# Patient Record
Sex: FEMALE | Race: WHITE | NOT HISPANIC OR LATINO
[De-identification: names, ages, dates, MRNs, and addresses within clinical notes are randomized per-mention and may not be internally consistent; named-entity substitution may affect disease eponyms.]

---

## 2022-09-13 PROBLEM — Z00.00 ENCOUNTER FOR PREVENTIVE HEALTH EXAMINATION: Status: ACTIVE | Noted: 2022-09-13

## 2022-09-14 ENCOUNTER — APPOINTMENT (OUTPATIENT)
Dept: ORTHOPEDIC SURGERY | Facility: CLINIC | Age: 67
End: 2022-09-14

## 2022-09-14 PROCEDURE — 99203 OFFICE O/P NEW LOW 30 MIN: CPT

## 2022-09-14 NOTE — ASSESSMENT
[FreeTextEntry1] : Discussed at length with patient regarding symptoms and diagnosis.  Treatment options discussed and patient's questions answered and patient expresses understanding.  CAM walker boot and patient to follow-up in 3 weeks.\par Patient instructed to call if any questions or concerns.\par

## 2022-09-14 NOTE — HISTORY OF PRESENT ILLNESS
[de-identified] : Patient is a new patient presenting with pain at the LEFT lateral ankle.  States slipped on a marble floor on 9/9/22 with pain and limited ability to weightbear.  States seen at [facility].  States pain and swelling improving.

## 2022-09-14 NOTE — PHYSICAL EXAM
[de-identified] : Left ankle\par \par Constitutional: \par The patient is healthy-appearing and in no apparent distress. \par \par Gait and Station: \par The patient ambulates with a limp and rolling knee scooter. \par \par Cardiovascular System: \par The capillary refill is less than 2 seconds. \par \par Skin: \par There are no skin abnormalities of ankle.\par \par Ankles and Feet: \par Inspection: \par There is no erythema.\par There is no induration.\par There is no warmth.\par There is no deformity.  \par There is swelling diffusely at the ankle. \par \par Bony Palpation: \par There is no tenderness of the calcaneal tuberosity.\par There is no tenderness of the metatarsals.\par There is no tenderness of the tarsometatarsal joints\par There is no tenderness of the navicular tuberosity. \par There is no tenderness of the dome of talus.\par There is no tenderness of the head of talus.\par There is no tenderness of the inferior tibiofibular joint.\par There is tenderness of the lateral malleolus.\par \par Soft Tissue Palpation: \par There is no tenderness of the tibialis posterior.\par There is no tenderness of the tibialis anterior. \par There is no tenderness of the plantar fascia.\par There is no tenderness of the Achilles tendon.\par There is no tenderness of the extensor hallucis longus.\par There is no tenderness of the sinus tarsi. \par There is no tenderness of the peroneus longus and brevis.\par There is no tenderness of the deltoid ligament.   \par There is no tenderness of the anterior talofibular ligament and the calcaneofibular ligament. \par \par Active Range of Motion: \par The range of motion at the ankle is full. \par \par Stability: \par The anterior drawer is negative. \par \par Strength: \par There is 5/5 ankle plantarflexion and dorsiflexion.\par \par Neurological System: \par There is normal sensation to light touch at the ankle and foot. \par \par Psychiatric: \par The patient demonstrates a normal mood and affect and is active and alert.\par  [de-identified] : X-ray LEFT ankle ( [facility] ): There is a non-displaced Richardson A lateral malleolus fracture with an intact mortise.

## 2022-10-05 ENCOUNTER — APPOINTMENT (OUTPATIENT)
Dept: ORTHOPEDIC SURGERY | Facility: CLINIC | Age: 67
End: 2022-10-05

## 2022-10-05 PROCEDURE — 73610 X-RAY EXAM OF ANKLE: CPT | Mod: LT

## 2022-10-05 PROCEDURE — 99213 OFFICE O/P EST LOW 20 MIN: CPT

## 2022-10-05 NOTE — HISTORY OF PRESENT ILLNESS
[de-identified] : Patient is presenting for followup evaluation she is overall she is feeling markedly improved.  Weightbearing as tolerated Cam Walker only

## 2022-10-05 NOTE — PHYSICAL EXAM
[de-identified] : Left ankle\par \par Constitutional: \par The patient is healthy-appearing and in no apparent distress. \par \par Gait and Station: \par The patient ambulates with a CAM walker. \par \par Cardiovascular System: \par The capillary refill is less than 2 seconds. \par \par Skin: \par There are no skin abnormalities of ankle.\par \par Ankles and Feet: \par Inspection: \par There is no erythema.\par There is no induration.\par There is no warmth.\par There is no deformity.  \par There is trace swelling laterally at the ankle. \par \par Bony Palpation: \par There is no tenderness of the calcaneal tuberosity.\par There is no tenderness of the metatarsals.\par There is no tenderness of the tarsometatarsal joints\par There is no tenderness of the navicular tuberosity. \par There is no tenderness of the dome of talus.\par There is no tenderness of the head of talus.\par There is no tenderness of the inferior tibiofibular joint.\par There is no tenderness of the lateral malleolus.\par \par Soft Tissue Palpation: \par There is no tenderness of the tibialis posterior.\par There is no tenderness of the tibialis anterior. \par There is no tenderness of the plantar fascia.\par There is no tenderness of the Achilles tendon.\par There is no tenderness of the extensor hallucis longus.\par There is no tenderness of the sinus tarsi. \par There is no tenderness of the peroneus longus and brevis.\par There is no tenderness of the deltoid ligament.   \par There is no tenderness of the anterior talofibular ligament and the calcaneofibular ligament. \par \par Active Range of Motion: \par The range of motion at the ankle is full. \par \par Stability: \par The anterior drawer is negative. \par \par Strength: \par There is 5/5 ankle plantarflexion and dorsiflexion.\par \par Neurological System: \par There is normal sensation to light touch at the ankle and foot. \par \par Psychiatric: \par The patient demonstrates a normal mood and affect and is active and alert.\par  [de-identified] : Given patient's reported history and physical examination, x-ray evaluation ( as listed below ) was ordered and performed to aid in diagnosis and treatment of the patient.\par X-ray left ankle.  There is a healed distal fibula fracture with adequate overall alignment\par

## 2022-10-05 NOTE — ASSESSMENT
[FreeTextEntry1] : Discussed at this time to discontinue Cam Walker and given home exercises as well as physical therapy,  She is to follow up on an as-needed basis in 5-6 weeks

## 2022-11-25 ENCOUNTER — EMERGENCY (EMERGENCY)
Facility: HOSPITAL | Age: 67
LOS: 1 days | Discharge: HOME | End: 2022-11-25
Admitting: EMERGENCY MEDICINE
Payer: MEDICARE

## 2022-11-25 VITALS
WEIGHT: 128.97 LBS | OXYGEN SATURATION: 99 % | HEART RATE: 67 BPM | SYSTOLIC BLOOD PRESSURE: 135 MMHG | HEIGHT: 60 IN | DIASTOLIC BLOOD PRESSURE: 76 MMHG | RESPIRATION RATE: 17 BRPM | TEMPERATURE: 98 F
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
## 2022-11-25 DIAGNOSIS — I10 ESSENTIAL (PRIMARY) HYPERTENSION: ICD-10-CM

## 2022-11-25 DIAGNOSIS — M25.532 PAIN IN LEFT WRIST: ICD-10-CM

## 2022-11-25 DIAGNOSIS — S52.612A DISPLACED FRACTURE OF LEFT ULNA STYLOID PROCESS, INITIAL ENCOUNTER FOR CLOSED FRACTURE: ICD-10-CM
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
## 2022-11-25 DIAGNOSIS — S52.552A OTHER EXTRAARTICULAR FRACTURE OF LOWER END OF LEFT RADIUS, INITIAL ENCOUNTER FOR CLOSED FRACTURE: ICD-10-CM

## 2022-11-25 PROCEDURE — 99284 EMERGENCY DEPT VISIT MOD MDM: CPT

## 2022-11-25 PROCEDURE — 73100 X-RAY EXAM OF WRIST: CPT | Mod: 26,RT

## 2022-11-25 PROCEDURE — 73110 X-RAY EXAM OF WRIST: CPT

## 2022-11-25 PROCEDURE — 73110 X-RAY EXAM OF WRIST: CPT | Mod: 26,LT

## 2022-11-25 PROCEDURE — 73100 X-RAY EXAM OF WRIST: CPT

## 2022-11-25 PROCEDURE — 25605 CLTX DST RDL FX/EPHYS SEP W/: CPT | Mod: LT

## 2022-11-25 PROCEDURE — 99285 EMERGENCY DEPT VISIT HI MDM: CPT | Mod: 25

## 2022-11-25 RX ORDER — HYDROMORPHONE HYDROCHLORIDE 2 MG/ML
0.5 INJECTION INTRAMUSCULAR; INTRAVENOUS; SUBCUTANEOUS ONCE
Refills: 0 | Status: DISCONTINUED | OUTPATIENT
Start: 2022-11-25 | End: 2022-11-25

## 2022-11-25 RX ADMIN — HYDROMORPHONE HYDROCHLORIDE 0.5 MILLIGRAM(S): 2 INJECTION INTRAMUSCULAR; INTRAVENOUS; SUBCUTANEOUS at 12:34

## 2022-11-25 NOTE — ED PROVIDER NOTE - OBJECTIVE STATEMENT
66 y/o F, PMHx of HTN, left hand dominant,  now presenting to ED w/ distal radius fx s/p mechanical fall since yesterday. Pt went to  yesterday and was told they had to reduce fx and come to ED. She didn't come yesterday. They put her in splint. She denies tingling and numbness. X ray shows impacted radius fx.

## 2022-11-25 NOTE — ED PROVIDER NOTE - NSFOLLOWUPINSTRUCTIONS_ED_ALL_ED_FT
Follow up with Dr. Graves at [facility]      Wrist Fracture Treated With Immobilization       A wrist fracture is a break or crack in one of the bones of your wrist. Your wrist is made of eight small bones at the palm of your hand (carpal bones) and two long bones of the forearm (radius and ulna).    A broken wrist is often treated by wearing a cast, splint, or sling (immobilization). This holds the broken pieces in place so they can heal.      What are the causes?    •A direct force to the wrist.      •Trauma, such as a car crash or falling on an outstretched hand.        What increases the risk?    •Doing contact sports or high-risk sports such as skiing, biking, and ice skating.      •Taking steroid medicines.      •Smoking.      •Being female.      •Being .      •Drinking more than three drinks that contain alcohol a day.      •Having a condition that weakens the bones (osteoporosis).      •Being older.      •Having had other broken bones in the past.        What are the signs or symptoms?    •Pain.       •Swelling.       •Bruising.       •Not being able to move the wrist normally.      •The wrist hanging in an odd position or looking oddly shaped.        How is this treated?    Treatment involves wearing a cast, splint, or sling. You may also need to take pain medicine. Once the injury has healed enough, you may begin doing range-of-motion exercises.      Follow these instructions at home:    If you have a cast:     • Do not stick anything inside the cast to scratch your skin.      •Check the skin around the cast every day. Tell your doctor if you see problems.      •You may put lotion on dry skin around the cast. Do not put lotion on the skin under the cast.      •Keep the cast clean and dry.      If you have a splint or sling:     •Wear the splint or sling as told by your doctor. Take it off only as told by your doctor.    •Loosen the splint or sling if your fingers:  •Tingle.      •Become numb.      •Turn cold and blue.        •Keep the splint or sling clean and dry.      Bathing     • Do not take baths, swim, or use a hot tub. Ask your doctor about taking showers or sponge baths.    •If your cast, splint, or sling is not waterproof:   •Do not let it get wet.      •Cover it with a watertight covering when you take a bath or shower.        •If you have a sling, take it off for bathing only if your doctor says this is okay.        Managing pain, stiffness, and swelling    •If told, put ice on the injured area. To do this:  •If you have a removable splint or sling, take it off as told by your doctor.      •Put ice in a plastic bag.      •Place a towel between your skin and the bag or between your cast and the bag.      •Leave the ice on for 20 minutes, 2–3 times a day.      •Take off the ice if your skin turns bright red. This is very important. If you cannot feel pain, heat, or cold, you have a greater risk of damage to the area.        •Move your fingers often.      •Raise the injured area above the level of your heart while you are sitting or lying down.      Activity     •Return to your normal activities when your doctor says that it is safe.      • Do not lift with or put weight on your injured wrist until your doctor says this is okay.      •Ask your doctor when it is safe to drive if you have a cast, splint, or sling on your wrist.      •Do range-of-motion exercises only as told by your doctor.      Medicines     •Take over-the-counter and prescription medicines only as told by your doctor.      •Ask your doctor if you should avoid driving or using machines while you are taking your medicine.      General instructions     • Do not put pressure on any part of the cast or splint until it is fully hardened.      • Do not smoke or use any products that contain nicotine or tobacco. If you need help quitting, ask your doctor.    •If told, take steps to prevent problems with pooping (constipation). You may need to:  •Drink enough fluid to keep your pee (urine) pale yellow.      •Take medicines. You will be told what medicines to take.      •Eat foods that are high in fiber. These include beans, whole grains, and fresh fruits and vegetables.      •Limit foods that are high in fat and sugar. These include fried or sweet foods.        •Keep all follow-up visits.        Contact a doctor if:    •Your cast, splint, or sling is damaged or loose.      •You have any new pain, swelling, or bruising.      •Your pain, swelling, and bruising do not get better.      •You have a fever.      •You have chills.        Get help right away if:    •Your skin or fingers on your injured arm turn blue or gray.      •Your arm feels cold or gets numb.      •You have very bad pain in your injured wrist.        Summary    •A wrist fracture is a break or crack in one of the bones of your wrist.      •A broken wrist is often treated by wearing a cast, splint, or sling.      •You should check the skin around a cast every day.      •Take off a splint or sling only as told by your doctor.      This information is not intended to replace advice given to you by your health care provider. Make sure you discuss any questions you have with your health care provider.

## 2022-11-25 NOTE — ED PROVIDER NOTE - PATIENT PORTAL LINK FT
You can access the FollowMyHealth Patient Portal offered by [facility] by registering at the following website: [email or web address]. By joining [facility]’s FollowMyHealth portal, you will also be able to view your health information using other applications (apps) compatible with our system.

## 2022-11-25 NOTE — ED PROVIDER NOTE - PHYSICAL EXAMINATION
CONSTITUTIONAL: Well-appearing;  in no apparent distress.   HEAD: Normocephalic; atraumatic.   EYES: PERRL; EOM intact; conjunctiva and sclera clear  ENT: normal nose; no rhinorrhea; normal pharynx with no erythema or lesions.   NECK: Supple; non-tender;   CARDIOVASCULAR: rrr, no audible murmurs   RESPIRATORY: Breathing easily;   MSK: FROM at all extremities, normal tone. R wrist in splint, moving all fingers freely.   EXT: No cyanosis or edema; N/V intact  SKIN: Normal for age and race; warm; dry; good turgor; no apparent lesions or rash.

## 2022-11-25 NOTE — ED PROVIDER NOTE - CLINICAL SUMMARY MEDICAL DECISION MAKING FREE TEXT BOX
66 y/o F w/ R wrist fx. Will consult ortho for reduction. 66 y/o F w/ R wrist fx. Reduced by ortho. Will f/u in office.

## 2022-11-25 NOTE — ED ADULT TRIAGE NOTE - CHIEF COMPLAINT QUOTE
Pt presents co R wrist pain s/p mechanical trip and fall on Wednesday. Pt was seen @ [facility] and had RX showing impacted fx through distal radial metaphysis with dorsal angulation of distal fx fragment. Sent for ortho eval.

## 2022-11-25 NOTE — CONSULT NOTE ADULT - SUBJECTIVE AND OBJECTIVE BOX
Orthopaedic Surgery Consult Note    For Surgeon:  Ely    HPI:  67y old  Female who presents with a chief complaint of L wrist pain after a ground level fall on 11/23.  Pt denies any numbness/tingling, head trauma/LOC. Endorses moderate pain to her wrist. Able to wiggle her fingers.    Allergies    No Known Allergies    Intolerances      PAST MEDICAL & SURGICAL HISTORY:  HTN (hypertension)        MEDICATIONS  (STANDING):    MEDICATIONS  (PRN):      Vital Signs Last 24 Hrs  T(C): 36.5 (25 Nov 2022 10:35), Max: 36.5 (25 Nov 2022 10:35)  T(F): 97.7 (25 Nov 2022 10:35), Max: 97.7 (25 Nov 2022 10:35)  HR: 67 (25 Nov 2022 10:35) (67 - 67)  BP: 135/76 (25 Nov 2022 10:35) (135/76 - 135/76)  BP(mean): --  RR: 17 (25 Nov 2022 10:35) (17 - 17)  SpO2: 99% (25 Nov 2022 10:35) (99% - 99%)    Parameters below as of 25 Nov 2022 10:35  Patient On (Oxygen Delivery Method): room air        Physical Exam:  Left  upper extremity  Skin intact, +Swelling, +Ecchymosis  Decreased rom wrist 2/2 pain  Pulses intact, brisk cap refill  Sensation intact M/R/U  Motor intact AIN/PIN/U  WF/WE ROM reduced compared to contralateral   Supination/Pronation ROM reduced compared to contralateral   Elbow NT full active rom w/o pain    Imaging:   Xray Left wrist shows +extra- articular dorsally displaceddistal radius fracture    Procedure: Using aseptic technique injected 10cc 1% lidocaine w/o epi into Left wrist performing hematoma block. Reduction performed, sugartong splint applied. Patient tolerated procedure well, neurovasc intact afterwards.    A/P: 67yFemale with Left distal radius fx  - fracture reduction performed  -NWB LUE, sling when ambulating, elevate LUE when lying down or sitting   -Pain control  -ice/elevation  -Post splint xray reviewed, fracture well aligned   -f/u with Dr. Graves in 3-5 days to discuss definitive treatment   -Discussed strict ED return precuations including tingling/numbness, severe pain, other concerns. Pt understood   -Case including imaging and plan reviewed and discussed with Dr. lEy Garcia, PGY-2  Orthopedic Surgery  Ortho Pager [phone]

## 2022-11-25 NOTE — ED ADULT NURSE NOTE - OBJECTIVE STATEMENT
patient presents to ED with Rt. wrist evaluation, she fell down and was diagnosed with wrist dislocation from . Denies chest pain ,sob, dizziness, headache, LOC. A&OX4, stable at chair.

## 2022-11-25 NOTE — ED PROVIDER NOTE - CARE PROVIDER_API CALL
Silas Graves)  Orthoped[facility]  [address], [address] [place] [address]  Phone: [phone]  Fax: ()-  Follow Up Time:

## 2022-11-28 PROBLEM — I10 ESSENTIAL (PRIMARY) HYPERTENSION: Chronic | Status: ACTIVE | Noted: 2022-11-25

## 2022-12-01 ENCOUNTER — APPOINTMENT (OUTPATIENT)
Dept: ORTHOPEDIC SURGERY | Facility: CLINIC | Age: 67
End: 2022-12-01

## 2022-12-01 DIAGNOSIS — Z78.9 OTHER SPECIFIED HEALTH STATUS: ICD-10-CM

## 2022-12-01 DIAGNOSIS — S61.219A LACERATION W/OUT FOREIGN BODY OF UNSPECIFIED FINGER W/OUT DAMAGE TO NAIL, INITIAL ENCOUNTER: ICD-10-CM

## 2022-12-01 DIAGNOSIS — Z87.891 PERSONAL HISTORY OF NICOTINE DEPENDENCE: ICD-10-CM

## 2022-12-01 PROCEDURE — 73110 X-RAY EXAM OF WRIST: CPT | Mod: LT

## 2022-12-01 PROCEDURE — 99204 OFFICE O/P NEW MOD 45 MIN: CPT

## 2022-12-01 PROCEDURE — 73140 X-RAY EXAM OF FINGER(S): CPT | Mod: F1

## 2022-12-05 PROBLEM — S82.65XA CLOSED NONDISPLACED FRACTURE OF LATERAL MALLEOLUS OF LEFT FIBULA, INITIAL ENCOUNTER: Status: ACTIVE | Noted: 2022-09-14

## 2022-12-06 ENCOUNTER — APPOINTMENT (OUTPATIENT)
Dept: ORTHOPEDIC SURGERY | Facility: CLINIC | Age: 67
End: 2022-12-06

## 2022-12-06 DIAGNOSIS — S82.65XA NONDISPLACED FRACTURE OF LATERAL MALLEOLUS OF LEFT FIBULA, INITIAL ENCOUNTER FOR CLOSED FRACTURE: ICD-10-CM

## 2022-12-06 PROCEDURE — 99213 OFFICE O/P EST LOW 20 MIN: CPT

## 2022-12-06 PROCEDURE — 73110 X-RAY EXAM OF WRIST: CPT | Mod: RT

## 2022-12-16 ENCOUNTER — APPOINTMENT (OUTPATIENT)
Dept: ORTHOPEDIC SURGERY | Facility: CLINIC | Age: 67
End: 2022-12-16

## 2022-12-16 VITALS — HEIGHT: 60 IN | RESPIRATION RATE: 16 BRPM | BODY MASS INDEX: 24.54 KG/M2 | WEIGHT: 125 LBS

## 2022-12-16 PROCEDURE — 29125 APPL SHORT ARM SPLINT STATIC: CPT | Mod: RT

## 2022-12-16 PROCEDURE — 73110 X-RAY EXAM OF WRIST: CPT | Mod: RT

## 2022-12-16 PROCEDURE — 99213 OFFICE O/P EST LOW 20 MIN: CPT | Mod: 25

## 2022-12-19 ENCOUNTER — LABORATORY RESULT (OUTPATIENT)
Age: 67
End: 2022-12-19

## 2022-12-19 RX ORDER — ACETAMINOPHEN AND CODEINE 300; 30 MG/1; MG/1
300-30 TABLET ORAL
Qty: 20 | Refills: 0 | Status: ACTIVE | COMMUNITY
Start: 2022-12-19 | End: 1900-01-01

## 2022-12-19 RX ORDER — MULTIVIT-MIN/FOLIC/VIT K/LYCOP 400-300MCG
1000 TABLET ORAL DAILY
Qty: 30 | Refills: 0 | Status: ACTIVE | COMMUNITY
Start: 2022-12-19 | End: 1900-01-01

## 2022-12-20 ENCOUNTER — TRANSCRIPTION ENCOUNTER (OUTPATIENT)
Age: 67
End: 2022-12-20

## 2022-12-20 RX ORDER — CHLORHEXIDINE GLUCONATE 213 G/1000ML
1 SOLUTION TOPICAL DAILY
Refills: 0 | Status: DISCONTINUED | OUTPATIENT
Start: 2022-12-21 | End: 2022-12-21

## 2022-12-20 NOTE — ASU PATIENT PROFILE, ADULT - NSICDXPASTMEDICALHX_GEN_ALL_CORE_FT
PAST MEDICAL HISTORY:  Anxiety     History of fracture of left ankle     HTN (hypertension)     Hypothyroidism     Skin cancer

## 2022-12-20 NOTE — ASU PATIENT PROFILE, ADULT - NS PREOP UNDERSTANDS INFO
No solid food or milk products after 11:15pm 12/20/2022/ water no later than 5am DOS/ photo ID and insurance card/ comfortable clothing/ escort to take you home/yes

## 2022-12-20 NOTE — ASU PATIENT PROFILE, ADULT - NS SC CAGE ALCOHOL EYE OPENER
Gen: well-nourished; NAD  Skin: warm and dry, no rashes  Head: NC/AT  Eyes: PERRLA; EOM intact; conjunctiva clear  ENT: external ear normal, no nasal discharge  Mouth: MMM, no pharyngeal erythema  Neck: FROM, non-tender,  Resp: no chest wall deformity; CTAB with good aeration, normal WOB  Cardio: RRR, S1/S2 normal; no m/r/g  Abd: soft, distended abdomen, RLQ tenderness upon deep palpation; normoactive bowel sounds; no HSM, no masses, well healed midline surgical scar  Extremities: FROM, no tenderness, no edema  Vascular: pulses 2+ bilat UE, brisk capillary refill  Neuro: alert, oriented, no gross deficits  MSK: normal tone, without deformities no

## 2022-12-20 NOTE — ASU PATIENT PROFILE, ADULT - FALL HARM RISK - RISK INTERVENTIONS

## 2022-12-20 NOTE — ASU PATIENT PROFILE, ADULT - NSICDXPASTSURGICALHX_GEN_ALL_CORE_FT
PAST SURGICAL HISTORY:  History of bunionectomy     History of Mohs surgery for squamous cell carcinoma of skin

## 2022-12-21 ENCOUNTER — APPOINTMENT (OUTPATIENT)
Dept: ORTHOPEDIC SURGERY | Facility: AMBULATORY SURGERY CENTER | Age: 67
End: 2022-12-21

## 2022-12-21 ENCOUNTER — TRANSCRIPTION ENCOUNTER (OUTPATIENT)
Age: 67
End: 2022-12-21

## 2022-12-21 ENCOUNTER — OUTPATIENT (OUTPATIENT)
Dept: OUTPATIENT SERVICES | Facility: HOSPITAL | Age: 67
LOS: 1 days | Discharge: HOME | End: 2022-12-21

## 2022-12-21 VITALS
DIASTOLIC BLOOD PRESSURE: 95 MMHG | HEART RATE: 51 BPM | OXYGEN SATURATION: 100 % | WEIGHT: 124.34 LBS | RESPIRATION RATE: 16 BRPM | HEIGHT: 60 IN | TEMPERATURE: 99 F | SYSTOLIC BLOOD PRESSURE: 121 MMHG

## 2022-12-21 VITALS
SYSTOLIC BLOOD PRESSURE: 142 MMHG | RESPIRATION RATE: 16 BRPM | OXYGEN SATURATION: 100 % | TEMPERATURE: 98 F | DIASTOLIC BLOOD PRESSURE: 70 MMHG | HEART RATE: 66 BPM

## 2022-12-21 DIAGNOSIS — Z98.890 OTHER SPECIFIED POSTPROCEDURAL STATES: Chronic | ICD-10-CM

## 2022-12-21 PROCEDURE — 25290 INCISE WRIST/FOREARM TENDON: CPT | Mod: RT

## 2022-12-21 PROCEDURE — 25608 OPTX DST RD XART FX/EPI SEP2: CPT | Mod: RT

## 2022-12-21 DEVICE — IMPLANTABLE DEVICE: Type: IMPLANTABLE DEVICE | Site: RIGHT | Status: FUNCTIONAL

## 2022-12-21 DEVICE — PLATE GEMINUS VOLAR DIST RAD NRRW 3H RT: Type: IMPLANTABLE DEVICE | Site: RIGHT | Status: FUNCTIONAL

## 2022-12-21 DEVICE — SCREW CORT LOKG 3.5X12MM: Type: IMPLANTABLE DEVICE | Site: RIGHT | Status: FUNCTIONAL

## 2022-12-21 DEVICE — KWIRE STD TIP 0.9X152MM: Type: IMPLANTABLE DEVICE | Site: RIGHT | Status: FUNCTIONAL

## 2022-12-21 DEVICE — SCREW CORT NON LOKG 3.5X10MM: Type: IMPLANTABLE DEVICE | Site: RIGHT | Status: FUNCTIONAL

## 2022-12-21 DEVICE — PEG THREADED LOCKING 2.3X17MM: Type: IMPLANTABLE DEVICE | Site: RIGHT | Status: FUNCTIONAL

## 2022-12-21 DEVICE — KWIRE STD TIP 1.5X127MM: Type: IMPLANTABLE DEVICE | Site: RIGHT | Status: FUNCTIONAL

## 2022-12-21 DEVICE — PEG THREADED LOKG 2.3X16MM: Type: IMPLANTABLE DEVICE | Site: RIGHT | Status: FUNCTIONAL

## 2022-12-21 RX ORDER — ACETAMINOPHEN 500 MG
650 TABLET ORAL ONCE
Refills: 0 | Status: DISCONTINUED | OUTPATIENT
Start: 2022-12-21 | End: 2022-12-21

## 2022-12-21 RX ORDER — NEBIVOLOL HYDROCHLORIDE 5 MG/1
1 TABLET ORAL
Qty: 0 | Refills: 0 | DISCHARGE

## 2022-12-21 RX ORDER — FLUOXETINE HCL 10 MG
1 CAPSULE ORAL
Qty: 0 | Refills: 0 | DISCHARGE

## 2022-12-21 RX ORDER — LEVOTHYROXINE SODIUM 125 MCG
1 TABLET ORAL
Qty: 0 | Refills: 0 | DISCHARGE

## 2022-12-21 RX ORDER — CLONAZEPAM 1 MG
0 TABLET ORAL
Qty: 0 | Refills: 0 | DISCHARGE

## 2022-12-21 RX ORDER — ONDANSETRON 8 MG/1
4 TABLET, FILM COATED ORAL ONCE
Refills: 0 | Status: DISCONTINUED | OUTPATIENT
Start: 2022-12-21 | End: 2022-12-21

## 2022-12-21 NOTE — ASU DISCHARGE PLAN (ADULT/PEDIATRIC) - CARE PROVIDER_API CALL
Silas Graves)  Orthopedics  [facility]  [address] [place] [address]  Phone: [phone]  Fax: ()-  Follow Up Time:

## 2022-12-21 NOTE — ASU DISCHARGE PLAN (ADULT/PEDIATRIC) - NS MD DC FALL RISK RISK
For information on Fall & Injury Prevention, visit: [email or web address] OR  [email or web address] OR  https://www.cdc.gov/steadi/patient.html

## 2022-12-22 ENCOUNTER — APPOINTMENT (OUTPATIENT)
Dept: ORTHOPEDIC SURGERY | Facility: CLINIC | Age: 67
End: 2022-12-22

## 2023-01-05 ENCOUNTER — APPOINTMENT (OUTPATIENT)
Dept: ORTHOPEDIC SURGERY | Facility: CLINIC | Age: 68
End: 2023-01-05
Payer: MEDICARE

## 2023-01-05 PROBLEM — F41.9 ANXIETY DISORDER, UNSPECIFIED: Chronic | Status: ACTIVE | Noted: 2022-12-21

## 2023-01-05 PROBLEM — Z87.81 PERSONAL HISTORY OF (HEALED) TRAUMATIC FRACTURE: Chronic | Status: ACTIVE | Noted: 2022-12-21

## 2023-01-05 PROBLEM — C44.90 UNSPECIFIED MALIGNANT NEOPLASM OF SKIN, UNSPECIFIED: Chronic | Status: ACTIVE | Noted: 2022-12-21

## 2023-01-05 PROBLEM — E03.9 HYPOTHYROIDISM, UNSPECIFIED: Chronic | Status: ACTIVE | Noted: 2022-12-21

## 2023-01-05 PROCEDURE — 99024 POSTOP FOLLOW-UP VISIT: CPT

## 2023-01-05 PROCEDURE — 73110 X-RAY EXAM OF WRIST: CPT | Mod: RT

## 2023-02-07 ENCOUNTER — APPOINTMENT (OUTPATIENT)
Dept: ORTHOPEDIC SURGERY | Facility: CLINIC | Age: 68
End: 2023-02-07
Payer: MEDICARE

## 2023-02-07 PROCEDURE — 73110 X-RAY EXAM OF WRIST: CPT | Mod: RT

## 2023-02-07 PROCEDURE — 99024 POSTOP FOLLOW-UP VISIT: CPT

## 2023-02-09 ENCOUNTER — NON-APPOINTMENT (OUTPATIENT)
Age: 68
End: 2023-02-09

## 2023-02-13 ENCOUNTER — APPOINTMENT (OUTPATIENT)
Dept: RADIOLOGY | Facility: CLINIC | Age: 68
End: 2023-02-13

## 2023-02-13 ENCOUNTER — APPOINTMENT (OUTPATIENT)
Dept: MAMMOGRAPHY | Facility: CLINIC | Age: 68
End: 2023-02-13
Payer: MEDICARE

## 2023-02-13 PROCEDURE — 77067 SCR MAMMO BI INCL CAD: CPT

## 2023-02-13 PROCEDURE — 77080 DXA BONE DENSITY AXIAL: CPT

## 2023-02-13 PROCEDURE — 77063 BREAST TOMOSYNTHESIS BI: CPT

## 2023-03-21 ENCOUNTER — APPOINTMENT (OUTPATIENT)
Dept: ORTHOPEDIC SURGERY | Facility: CLINIC | Age: 68
End: 2023-03-21
Payer: MEDICARE

## 2023-03-21 DIAGNOSIS — S52.509A UNSPECIFIED FRACTURE OF THE LOWER END OF UNSPECIFIED RADIUS, INITIAL ENCOUNTER FOR CLOSED FRACTURE: ICD-10-CM

## 2023-03-21 PROCEDURE — 99024 POSTOP FOLLOW-UP VISIT: CPT

## 2023-03-21 PROCEDURE — 73110 X-RAY EXAM OF WRIST: CPT | Mod: RT

## 2023-03-23 ENCOUNTER — APPOINTMENT (OUTPATIENT)
Dept: ORTHOPEDIC SURGERY | Facility: CLINIC | Age: 68
End: 2023-03-23
Payer: MEDICARE

## 2023-03-23 DIAGNOSIS — S93.492A SPRAIN OF OTHER LIGAMENT OF LEFT ANKLE, INITIAL ENCOUNTER: ICD-10-CM

## 2023-03-23 DIAGNOSIS — M17.0 BILATERAL PRIMARY OSTEOARTHRITIS OF KNEE: ICD-10-CM

## 2023-03-23 PROCEDURE — 99213 OFFICE O/P EST LOW 20 MIN: CPT

## 2023-03-23 PROCEDURE — 73610 X-RAY EXAM OF ANKLE: CPT | Mod: LT

## 2023-03-23 PROCEDURE — 73562 X-RAY EXAM OF KNEE 3: CPT | Mod: 50

## 2023-03-24 NOTE — PHYSICAL EXAM
[de-identified] : Left ankle\par \par Constitutional: \par The patient is healthy-appearing and in no apparent distress. \par \par Gait and Station: \par The patient ambulates without assist.\par \par Cardiovascular System: \par The capillary refill is less than 2 seconds. \par \par Skin: \par There are no skin abnormalities of ankle.\par \par Ankles and Feet: \par Inspection: \par There is no erythema.\par There is no induration.\par There is no warmth.\par There is no deformity.  \par There is trace swelling laterally at the ankle. \par \par Bony Palpation: \par There is no tenderness of the calcaneal tuberosity.\par There is no tenderness of the metatarsals.\par There is no tenderness of the tarsometatarsal joints\par There is no tenderness of the navicular tuberosity. \par There is no tenderness of the dome of talus.\par There is no tenderness of the head of talus.\par There is no tenderness of the inferior tibiofibular joint.\par There is no tenderness of the lateral malleolus.\par \par Soft Tissue Palpation: \par There is no tenderness of the tibialis posterior.\par There is no tenderness of the tibialis anterior. \par There is no tenderness of the plantar fascia.\par There is no tenderness of the Achilles tendon.\par There is no tenderness of the extensor hallucis longus.\par There is no tenderness of the sinus tarsi. \par There is no tenderness of the peroneus longus and brevis.\par There is no tenderness of the deltoid ligament.   \par There is no tenderness of the anterior talofibular ligament and the calcaneofibular ligament. \par \par Active Range of Motion: \par The range of motion at the ankle is full. \par \par Stability: \par The anterior drawer is negative. \par \par Strength: \par There is 5/5 ankle plantarflexion and dorsiflexion.\par \par Neurological System: \par There is normal sensation to light touch at the ankle and foot. \par \par \par Bilateral knee\par \par Cardiovascular System: \par The capillary refill is less than 2 seconds. \par \par Skin: \par There are no skin abnormalities.\par \par Right Knee:\par  \par Bony Palpation: \par There is no tenderness of the medial joint line. \par There is no tenderness of the lateral joint line.\par There is tenderness of the medial femoral chondyle.\par There is no tenderness of the lateral femoral chondyle.\par There is no tenderness of the tibial tubercle.\par There is no tenderness of the superior patella.\par There is no tenderness of the inferior patella.\par There is tenderness of the medial patellar facet.\par There is tenderness of the lateral patellar facet.\par \par Soft Tissue Palpation: \par There is tenderness of the medial retinaculum.\par There is tenderness of the lateral retinaculum.\par There is no tenderness of the quadriceps tendon.\par There is no tenderness of the patella tendon.\par There is no tenderness of the ITB.\par There is no tenderness of the pes anserine.\par \par Active Range of Motion: \par The range of motion at the knee actively and passively is full. \par \par Special Tests: \par There is a negative Apley.\par There is a negative Steinmanns. \par There is a negative Lachman and Anterior Drawer.\par There is a negative Posterior Drawer.  \par There is no varus or valgus laxity.\par \par Strength: \par There is 5/5 hip flexion and 5/5 knee flexion and extension.  \par \par Left Knee:\par  \par Bony Palpation: \par There is no tenderness of the medial joint line. \par There is no tenderness of the lateral joint line.\par There is tenderness of the medial femoral chondyle.\par There is no tenderness of the lateral femoral chondyle.\par There is no tenderness of the tibial tubercle.\par There is no tenderness of the superior patella.\par There is no tenderness of the inferior patella.\par There is tenderness of the medial patellar facet.\par There is tenderness of the lateral patellar facet.\par \par Soft Tissue Palpation: \par There is tenderness of the medial retinaculum.\par There is tenderness of the lateral retinaculum.\par There is no tenderness of the quadriceps tendon.\par There is no tenderness of the patella tendon.\par There is no tenderness of the ITB.\par There is no tenderness of the pes anserine.\par \par Active Range of Motion: \par The range of motion at the knee actively and passively is full. \par \par Special Tests: \par There is a negative Apley.\par There is a negative Steinmanns. \par There is a negative Lachman and Anterior Drawer.\par There is a negative Posterior Drawer.  \par There is no varus or valgus laxity.\par \par Strength: \par There is 5/5 hip flexion and 5/5 knee flexion and extension.  \par \par Psychiatric: \par The patient demonstrates a normal mood and affect and is active and alert [de-identified] : Given patient's reported history and physical examination, x-ray evaluation ( as listed below ) was ordered and performed to aid in diagnosis and treatment of the patient.\par X-ray bilateral knee.  There is severe medial and patellofemoral arthritis and moderate lateral compartment arthritis\par X-ray left ankle.  There is no significant bony / soft tissue abnormality, arthritis, or fracture.

## 2023-03-24 NOTE — HISTORY OF PRESENT ILLNESS
[de-identified] : Patient is established patient presented for evaluation of left ankle discomfort over the last several weeks after twist as well as bilateral knee

## 2023-03-24 NOTE — REASON FOR VISIT
[Follow-Up Visit] : a follow-up visit for [FreeTextEntry2] : LEFT ankle pain, worsened since October, may have re-injured in December ; b/l knee pain

## 2023-03-24 NOTE — ASSESSMENT
[FreeTextEntry1] : Discussed at length the patient history exam and imaging at this time patient elects home exercises and physical therapy he declines any type of injection for the knees.

## 2023-06-20 ENCOUNTER — APPOINTMENT (OUTPATIENT)
Dept: ORTHOPEDIC SURGERY | Facility: CLINIC | Age: 68
End: 2023-06-20

## 2024-03-04 ENCOUNTER — APPOINTMENT (OUTPATIENT)
Dept: MAMMOGRAPHY | Facility: CLINIC | Age: 69
End: 2024-03-04
Payer: MEDICARE

## 2024-03-04 PROCEDURE — 77067 SCR MAMMO BI INCL CAD: CPT

## 2024-03-04 PROCEDURE — 77063 BREAST TOMOSYNTHESIS BI: CPT

## 2025-03-04 ENCOUNTER — APPOINTMENT (OUTPATIENT)
Dept: RADIOLOGY | Facility: CLINIC | Age: 70
End: 2025-03-04
Payer: MEDICARE

## 2025-03-04 ENCOUNTER — APPOINTMENT (OUTPATIENT)
Dept: MAMMOGRAPHY | Facility: CLINIC | Age: 70
End: 2025-03-04

## 2025-03-04 PROCEDURE — 77080 DXA BONE DENSITY AXIAL: CPT

## 2025-03-04 PROCEDURE — 77063 BREAST TOMOSYNTHESIS BI: CPT

## 2025-03-04 PROCEDURE — 77067 SCR MAMMO BI INCL CAD: CPT

## 2025-03-04 PROCEDURE — 76642 ULTRASOUND BREAST LIMITED: CPT | Mod: LT

## (undated) DEVICE — DRIVER UNIVERSAL QUICK CONNECT T10

## (undated) DEVICE — SUT MONOCRYL 3-0 18" PS-1

## (undated) DEVICE — SUT VICRYL 3-0 18" PS-1 UNDYED

## (undated) DEVICE — BUR MICROAIRE CARBIDE ROUND 4MM

## (undated) DEVICE — TUBING SUCTION NONCONDUCTIVE 6MM X 12FT

## (undated) DEVICE — MEE-ESU VALLEYLAB T2J57286DX: Type: DURABLE MEDICAL EQUIPMENT

## (undated) DEVICE — WARMING BLANKET LOWER ADULT

## (undated) DEVICE — DRSG KERLIX ROLL 4.5"

## (undated) DEVICE — DRIVER AO CONNECTION SQUARE TIP 2.0MM

## (undated) DEVICE — SUT NYLON 3-0 18" PS-2

## (undated) DEVICE — DRSG WEBRIL 4"

## (undated) DEVICE — VENODYNE/SCD SLEEVE CALF MEDIUM

## (undated) DEVICE — SUT VICRYL 2-0 27" CT-1 UNDYED

## (undated) DEVICE — TOURNIQUET CUFF 42" DUAL PORT W PLC

## (undated) DEVICE — PACK ORTHO ELBOW HAND

## (undated) DEVICE — Device

## (undated) DEVICE — DRILL SOLID SIDE CUTTING 2X40MM

## (undated) DEVICE — DRSG CAST PLASTER XFAST 4"

## (undated) DEVICE — TOURNIQUET CUFF 34" DUAL PORT W PLC

## (undated) DEVICE — DRSG XEROFORM 1 X 8"

## (undated) DEVICE — DRSG COMBINE 5X9"

## (undated) DEVICE — DRAPE C ARM MINI

## (undated) DEVICE — GLV 8.5 PROTEXIS (WHITE)

## (undated) DEVICE — GUIDE AIM-ING GUIDES 1.5MM

## (undated) DEVICE — DRSG ACE BANDAGE 4"

## (undated) DEVICE — DRILL SOLID SIDE CUTTING 2.5X40MM

## (undated) DEVICE — BIPOLAR FORCEP KIRWAN JEWELERS STR 4" X 0.4MM W 12FT CORD (GREEN)

## (undated) DEVICE — DRAPE LIGHT HANDLE COVER (GREEN)

## (undated) DEVICE — SAW BLADE STRYKER PRECISION 9X0.51X31MM

## (undated) DEVICE — DRAPE 3/4 SHEET 52X76"